# Patient Record
Sex: FEMALE | Race: WHITE | NOT HISPANIC OR LATINO | URBAN - METROPOLITAN AREA
[De-identification: names, ages, dates, MRNs, and addresses within clinical notes are randomized per-mention and may not be internally consistent; named-entity substitution may affect disease eponyms.]

---

## 2019-11-20 ENCOUNTER — EMERGENCY (EMERGENCY)
Facility: HOSPITAL | Age: 24
LOS: 1 days | Discharge: HOME | End: 2019-11-20
Admitting: EMERGENCY MEDICINE
Payer: OTHER MISCELLANEOUS

## 2019-11-20 VITALS
DIASTOLIC BLOOD PRESSURE: 77 MMHG | SYSTOLIC BLOOD PRESSURE: 113 MMHG | HEART RATE: 57 BPM | WEIGHT: 130.07 LBS | TEMPERATURE: 99 F | OXYGEN SATURATION: 98 % | HEIGHT: 68 IN | RESPIRATION RATE: 18 BRPM

## 2019-11-20 PROCEDURE — 99284 EMERGENCY DEPT VISIT MOD MDM: CPT

## 2019-11-20 PROCEDURE — 70450 CT HEAD/BRAIN W/O DYE: CPT | Mod: 26

## 2019-11-20 NOTE — ED PROVIDER NOTE - OBJECTIVE STATEMENT
25 y/o F with no known significant PMH presents to the ED for eval of head injury. She states she was walking down a set of stars sideways while carrying something, and she struck the right side of her head against the edge of a low-hanging steel door frame. She states she has localized pain and swelling to the right side of her head with associated nonspecific dizziness, nausea, and slightly blurred vision. She took ASA 162mg PO approximately 2 hours ago but states she still feels the dizziness and blurred vision so she came here for further eval.    Denies LOC, confusion, diplopia, neck pain or stiffness, vomiting, fall or any other associated injuries

## 2019-11-20 NOTE — ED PROVIDER NOTE - CLINICAL SUMMARY MEDICAL DECISION MAKING FREE TEXT BOX
23 y/o F presents to ED s/p head injury.  CT head negative.  VSS, NAD.  CT head negative.  Pt discharged home.

## 2019-11-20 NOTE — ED PROVIDER NOTE - HEAD SHAPE
Mild localized swelling to right parietal scalp, mildly tender. No deformity or crepitus./normal cephalic

## 2019-11-20 NOTE — ED ADULT TRIAGE NOTE - CHIEF COMPLAINT QUOTE
walk in pt with complaints of head injury at 730pm tonight sustained while at work. States she hit her head on metallic door frame. No LOC. Palpable hematoma noted to right side of head. Endorses that she took 2 aspirin (packet.)

## 2019-11-20 NOTE — ED ADULT NURSE NOTE - OBJECTIVE STATEMENT
Pt is a 24y female complaining of head injury. Pt states that she hit the right side of her head on a metal door frame while walking down the stairs at work. Small hematoma noted to right side of head. Pt denies loc. Pt states that she is now feeling dizzy, foggy and has blurred vision. Pt states that she took 2 aspirin before arrival.

## 2019-11-20 NOTE — ED ADULT NURSE NOTE - NSIMPLEMENTINTERV_GEN_ALL_ED
Implemented All Universal Safety Interventions:  [place] to call system. Call bell, personal items and telephone within reach. Instruct patient to call for assistance. Room bathroom lighting operational. Non-slip footwear when patient is off stretcher. Physically safe environment: no spills, clutter or unnecessary equipment. Stretcher in lowest position, wheels locked, appropriate side rails in place.

## 2019-11-20 NOTE — ED PROVIDER NOTE - PATIENT PORTAL LINK FT
You can access the FollowMyHealth Patient Portal offered by [facility] by registering at the following website: [email or web address]. By joining [email or web address] FollowMyHealth portal, you will also be able to view your health information using other applications (apps) compatible with our system.

## 2019-11-20 NOTE — ED PROVIDER NOTE - NSFOLLOWUPINSTRUCTIONS_ED_ALL_ED_FT
Follow up with primary care provider.    Return for changes in mental status, weakness, numbness, visual changes or other concerns.

## 2019-11-21 VITALS
SYSTOLIC BLOOD PRESSURE: 99 MMHG | DIASTOLIC BLOOD PRESSURE: 62 MMHG | RESPIRATION RATE: 18 BRPM | HEART RATE: 59 BPM | OXYGEN SATURATION: 97 % | TEMPERATURE: 98 F
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
## 2019-11-25 DIAGNOSIS — S09.90XA UNSPECIFIED INJURY OF HEAD, INITIAL ENCOUNTER: ICD-10-CM
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
